# Patient Record
Sex: MALE | Race: WHITE
[De-identification: names, ages, dates, MRNs, and addresses within clinical notes are randomized per-mention and may not be internally consistent; named-entity substitution may affect disease eponyms.]

---

## 2019-02-20 ENCOUNTER — HOSPITAL ENCOUNTER (INPATIENT)
Dept: HOSPITAL 74 - YASAS | Age: 59
LOS: 4 days | Discharge: HOME | End: 2019-02-24
Attending: SURGERY | Admitting: SURGERY
Payer: COMMERCIAL

## 2019-02-20 VITALS — BODY MASS INDEX: 26.4 KG/M2

## 2019-02-20 DIAGNOSIS — R55: ICD-10-CM

## 2019-02-20 DIAGNOSIS — R63.4: ICD-10-CM

## 2019-02-20 DIAGNOSIS — E86.0: ICD-10-CM

## 2019-02-20 DIAGNOSIS — F10.230: Primary | ICD-10-CM

## 2019-02-20 DIAGNOSIS — F17.210: ICD-10-CM

## 2019-02-20 DIAGNOSIS — Z59.0: ICD-10-CM

## 2019-02-20 PROCEDURE — HZ2ZZZZ DETOXIFICATION SERVICES FOR SUBSTANCE ABUSE TREATMENT: ICD-10-PCS | Performed by: SURGERY

## 2019-02-20 RX ADMIN — Medication SCH: at 12:13

## 2019-02-20 RX ADMIN — Medication SCH MG: at 22:02

## 2019-02-20 RX ADMIN — Medication PRN MG: at 22:02

## 2019-02-20 SDOH — ECONOMIC STABILITY - HOUSING INSECURITY: HOMELESSNESS: Z59.0

## 2019-02-20 NOTE — HP
CIWA Score


Nausea/Vomitin


Muscle Tremors: 2


Anxiety: 2


Agitation: 2


Paroxysmal Sweats: 1-Minimal Palms Moist


Orientation: 0-Oriented


Tacttile Disturbances: 1-Very Mild Itch/Numbness


Auditory Disturbances: 1-Very Mild


Visual Disturbances: 0-None


Headache: 2-Mild


CIWA-Ar Total Score: 13





- Admission Criteria


[facility] Guidelines: Admission for Medically Managed Detox: 


Requires at least one of the followin. CIWA greater than 12


2. Seizures within the past 24 hours


3. Delirium tremens within the past 24 hours


4. Hallucinations within the past 24 hours


5. Acute intervention needed for co  occurring medical disorder


6. Acute intervention needed for co  occurring psychiatric disorder


7. Severe withdrawal that cannot be handled at a lower level of care (continued


    vomiting, continued diarrhea, abnormal vital signs) requiring intravenous


    medication and/or fluids


8. Pregnancy





Patient presents the following: CIWA greater than 12


Admission Criteria Met: Admission criteria met





Admission ROS [facility]





- HPI


Chief Complaint: 





i need help to stop drinking alcohol


Allergies/Adverse Reactions: 


 Allergies











Allergy/AdvReac Type Severity Reaction Status Date / Time


 


No Known Allergies Allergy   Verified 19 09:28











History of Present Illness: 





this 58 years old male with alcohol dependence,seeking detox,withdrawal symptom,

last treatment in 19  [facility],


syncope alcohol related


had admissions in detox in the past but keep relapsing


nicotine dependence 1 pack/day


weight loss


longest period of sobriety 18 months


plan for out patient program after detox








Exam Limitations: No Limitations





- Ebola screening


Have you traveled outside of the country in the last 21 days: No


Have you had contact with anyone from an Ebola affected area: No


Do you have a fever: No





- Review of Systems


Constitutional: Loss of Appetite, Malaise, Night Sweats, Changes in sleep, 

Weakness, Unintentional Wgt. Loss


EENT: reports: Nose Congestion


Respiratory: reports: No Symptoms reported


Cardiac: reports: No Symptoms Reported


GI: reports: Nausea, Poor Appetite, Abdominal cramping


: reports: No Symptoms Reported


Musculoskeletal: reports: Back Pain, Muscle Pain


Integumentary: reports: Dryness


Neuro: reports: Headache, Tremors


Endocrine: reports: No Symptoms Reported


Hematology: reports: No Symptoms Reported


Psychiatric: reports: No Sypmtoms Reported, Judgement Intact, Mood/Affect 

Appropiate, Orientated x3


Other Systems: Reviewed and Negative





Patient History





- Patient Medical History


Hx Anemia: No


Hx Asthma: No


Hx Chronic Obstructive Pulmonary Disease (COPD): No


Hx Cancer: No


Hx Cardiac Disorders: No


Hx Congestive Heart Failure: No


Hx Hypertension: No


Hx Hypercholesterolemia: No


Hx Pacemaker: No


HX Cerebrovascular Accident: No


Hx Seizures: No


Hx Dementia: No


Hx Diabetes: No


Hx Gastrointestinal Disorders: No


Hx Liver Disease: No


Hx Genitourinary Disorders: No


Hx Sexually Transmitted Disorders: No


Hx Renal Disease (ESRD): No


Hx Thyroid Disease: No


Hx Human Immunodeficiency Virus (HIV): No (last  negative)


Hx Hepatitis C: No


Hx Depression: No


Hx Suicide Attempt: No


Hx Bipolar Disorder: No


Hx Schizophrenia: No


Other Medical History: no suicidal,no homicidal





- Patient Surgical History


Past Surgical History: No





- PPD History


Previous Implant?: Yes


Documented Results: Negative w/o proof


Implanted On Prior [facility] Admission?: No


PPD to be Administered?: Yes





- Smoking Cessation


Smoking history: Current every day smoker


Have you smoked in the past 12 months: Yes


Aproximately how many cigarettes per day: 20


Cigars Per Day: 0


Hx Chewing Tobacco Use: No


Initiated information on smoking cessation: Yes


'Breaking Loose' booklet given: 19





- Substance & Tx. History


Hx Alcohol Use: Yes


Hx Substance Use: No


Substance Use Type: Alcohol


Hx Substance Use Treatment: Yes ([facility] in )





- Substances Abused


  ** Alcohol


Route: Oral


Frequency: Daily


Amount used: 2pints of vodka/10 of 16 ozs of beer


Age of first use: 9


Date of Last Use: 19





Family Disease History





- Family Disease History


Family Disease History: Other: Father (alcohol,sober)





Admission Physical Exam [facility]





- Vital Signs


Vital Signs: 





 Vital Signs











Temperature  97.1 F L  19 09:11


 


Pulse Rate  71   19 09:11


 


Respiratory Rate  20   19 09:11


 


Blood Pressure  138/79   19 09:11


 


O2 Sat by Pulse Oximetry (%)      














- Physical


General Appearance: Yes: Moderate Distress, Tremorous, Irritable, Sweating, 

Anxious


HEENTM: Yes: Normal ENT Inspection, HOLLY, Pharynx Normal


Respiratory: Yes: Lungs Clear, Normal Breath Sounds, No Respiratory Distress


Neck: Yes: Within Normal Limits, Supple, Trachea in good position


Breast: Yes: Within Normal Limits


Cardiology: Yes: Within Normal Limits, Regular Rhythm, Regular Rate, S1, S2


Abdominal: Yes: Within Normal Limits, Normal Bowel Sounds, Non Tender, Flat, 

Soft


Genitourinary: Yes: Within Normal Limits


Back: Yes: Within Normal Limits, Muscle Spasm


Musculoskeletal: Yes: Back pain, Muscle Pain


Extremities: Yes: Tremors


Neurological: Yes: CNs II-XII NML intact, Fully Oriented, Alert, Motor Strength 

5/5


Integumentary: Yes: Dry


Lymphatic: Yes: Within Normal Limits





- Diagnostic


(1) Alcohol dependence with uncomplicated withdrawal


Current Visit: Yes   Status: Acute   





(2) Syncope


Current Visit: Yes   Status: Acute   





(3) Nicotine dependence


Current Visit: Yes   Status: Acute   





(4) Dehydration


Current Visit: Yes   Status: Acute   





(5) Weight loss


Current Visit: Yes   Status: Acute   





Cleared for Admission S





- Detox or Rehab


[facility] Level of Care: Medically Managed


Detox Regimen/Protocol: Librium





Inpatient Rehab Admission





- Rehab Decision to Admit


Inpatient rehab admission?: No

## 2019-02-21 LAB
ALBUMIN SERPL-MCNC: 3.8 G/DL (ref 3.4–5)
ALP SERPL-CCNC: 72 U/L (ref 45–117)
ALT SERPL-CCNC: 32 U/L (ref 13–61)
ANION GAP SERPL CALC-SCNC: 8 MMOL/L (ref 8–16)
APPEARANCE UR: CLEAR
AST SERPL-CCNC: 22 U/L (ref 15–37)
BILIRUB SERPL-MCNC: 0.8 MG/DL (ref 0.2–1)
BILIRUB UR STRIP.AUTO-MCNC: NEGATIVE MG/DL
BUN SERPL-MCNC: 12 MG/DL (ref 7–18)
CALCIUM SERPL-MCNC: 9.3 MG/DL (ref 8.5–10.1)
CHLORIDE SERPL-SCNC: 104 MMOL/L (ref 98–107)
CO2 SERPL-SCNC: 24 MMOL/L (ref 21–32)
COLOR UR: (no result)
CREAT SERPL-MCNC: 0.7 MG/DL (ref 0.55–1.3)
DEPRECATED RDW RBC AUTO: 13.6 % (ref 11.9–15.9)
GLUCOSE SERPL-MCNC: 107 MG/DL (ref 74–106)
HCT VFR BLD CALC: 46.5 % (ref 35.4–49)
HGB BLD-MCNC: 15.7 GM/DL (ref 11.7–16.9)
KETONES UR QL STRIP: NEGATIVE
LEUKOCYTE ESTERASE UR QL STRIP.AUTO: NEGATIVE
MCH RBC QN AUTO: 31.8 PG (ref 25.7–33.7)
MCHC RBC AUTO-ENTMCNC: 33.7 G/DL (ref 32–35.9)
MCV RBC: 94.5 FL (ref 80–96)
NITRITE UR QL STRIP: NEGATIVE
PH UR: 6 [PH] (ref 5–8)
PLATELET # BLD AUTO: 198 K/MM3 (ref 134–434)
PMV BLD: 8.5 FL (ref 7.5–11.1)
POTASSIUM SERPLBLD-SCNC: 3.9 MMOL/L (ref 3.5–5.1)
PROT SERPL-MCNC: 7.6 G/DL (ref 6.4–8.2)
PROT UR QL STRIP: NEGATIVE
PROT UR QL STRIP: NEGATIVE
RBC # BLD AUTO: 4.93 M/MM3 (ref 4–5.6)
SODIUM SERPL-SCNC: 137 MMOL/L (ref 136–145)
SP GR UR: 1.01 (ref 1.01–1.03)
UROBILINOGEN UR STRIP-MCNC: NEGATIVE MG/DL (ref 0.2–1)
WBC # BLD AUTO: 5.1 K/MM3 (ref 4–10)

## 2019-02-21 RX ADMIN — Medication PRN MG: at 22:24

## 2019-02-21 RX ADMIN — Medication SCH TAB: at 10:18

## 2019-02-21 RX ADMIN — NICOTINE SCH: 21 PATCH TRANSDERMAL at 12:30

## 2019-02-21 RX ADMIN — Medication SCH: at 23:19

## 2019-02-21 NOTE — EKG
Test Reason : 

Blood Pressure : ***/*** mmHG

Vent. Rate : 075 BPM     Atrial Rate : 075 BPM

   P-R Int : 164 ms          QRS Dur : 094 ms

    QT Int : 396 ms       P-R-T Axes : 063 -19 -23 degrees

   QTc Int : 442 ms

 

NORMAL SINUS RHYTHM

SEPTAL INFARCT , AGE UNDETERMINED

ABNORMAL ECG

NO PREVIOUS ECGS AVAILABLE

Confirmed by SAE BALDWIN, MARLEN ([id number]) on 2/21/2019 2:33:41 AM

 

Referred By:  XIMENA GASTON           Confirmed By:MARLEN QUEVEDO MD

## 2019-02-21 NOTE — PN
S CIWA





- CIWA Score


Nausea/Vomitin-No Nausea/No Vomiting


Muscle Tremors: 4-Moderate,w/Arms Extend


Anxiety: 3


Agitation: 4-Moderately Restless


Paroxysmal Sweats: 3


Orientation: 0-Oriented


Tacttile Disturbances: 0-None


Auditory Disturbances: 0-None


Visual Disturbances: 0-None


Headache: 1-Very Mild


CIWA-Ar Total Score: 15





[facility] Progress Note (SOAP)


Subjective: 





sweats


shakes


headache


interrupted sleep


body aches


Objective: 





19 10:43


 Vital Signs











Temperature  98.2 F   19 09:28


 


Pulse Rate  81   19 09:28


 


Respiratory Rate  18   19 09:28


 


Blood Pressure  116/82   19 09:28


 


O2 Sat by Pulse Oximetry (%)      











rest of labs pending


aaox3


ambulating


no acute distress


Assessment: 





19 10:44


withdrawal sx


Plan: 





continue detox


increase fluids


labs pending

## 2019-02-22 RX ADMIN — Medication SCH TAB: at 10:27

## 2019-02-22 RX ADMIN — NICOTINE SCH MG: 21 PATCH TRANSDERMAL at 10:27

## 2019-02-22 RX ADMIN — Medication SCH MG: at 22:46

## 2019-02-22 RX ADMIN — Medication PRN MG: at 22:49

## 2019-02-22 NOTE — PN
[facility] CIWA





- CIWA Score


Nausea/Vomitin-No Nausea/No Vomiting


Muscle Tremors: 3


Anxiety: 3


Agitation: 3


Paroxysmal Sweats: 3


Orientation: 0-Oriented


Tacttile Disturbances: 0-None


Auditory Disturbances: 0-None


Visual Disturbances: 0-None


Headache: 0-None Present


CIWA-Ar Total Score: 12





[facility] Progress Note (SOAP)


Subjective: 





sweats


shakes


interrupted sleep


tired


Objective: 





19 10:36


 Vital Signs











Temperature  98.0 F   19 08:58


 


Pulse Rate  89   19 08:58


 


Respiratory Rate  18   19 08:58


 


Blood Pressure  118/82   19 08:58


 


O2 Sat by Pulse Oximetry (%)      








 Laboratory Tests











  19





  06:00 08:38 08:38


 


WBC  5.1  


 


RBC  4.93  


 


Hgb  15.7  


 


Hct  46.5  


 


MCV  94.5  


 


MCH  31.8  


 


MCHC  33.7  


 


RDW  13.6  


 


Plt Count  198  


 


MPV  8.5  


 


Sodium   137 


 


Potassium   3.9 


 


Chloride   104 


 


Carbon Dioxide   24 


 


Anion Gap   8 


 


BUN   12 


 


Creatinine   0.7 


 


Creat Clearance w eGFR   > 60 


 


Random Glucose   107 H 


 


Calcium   9.3 


 


Total Bilirubin   0.8 


 


AST   22 


 


ALT   32 


 


Alkaline Phosphatase   72 


 


Total Protein   7.6 


 


Albumin   3.8 


 


Urine Color   


 


Urine Appearance   


 


Urine pH   


 


Ur Specific Gravity   


 


Urine Protein   


 


Urine Glucose (UA)   


 


Urine Ketones   


 


Urine Blood   


 


Urine Nitrite   


 


Urine Bilirubin   


 


Urine Urobilinogen   


 


Ur Leukocyte Esterase   


 


RPR Titer    Nonreactive














  19





  11:15


 


WBC 


 


RBC 


 


Hgb 


 


Hct 


 


MCV 


 


MCH 


 


MCHC 


 


RDW 


 


Plt Count 


 


MPV 


 


Sodium 


 


Potassium 


 


Chloride 


 


Carbon Dioxide 


 


Anion Gap 


 


BUN 


 


Creatinine 


 


Creat Clearance w eGFR 


 


Random Glucose 


 


Calcium 


 


Total Bilirubin 


 


AST 


 


ALT 


 


Alkaline Phosphatase 


 


Total Protein 


 


Albumin 


 


Urine Color  Ltyellow


 


Urine Appearance  Clear


 


Urine pH  6.0


 


Ur Specific Gravity  1.012


 


Urine Protein  Negative


 


Urine Glucose (UA)  Negative


 


Urine Ketones  Negative


 


Urine Blood  Negative


 


Urine Nitrite  Negative


 


Urine Bilirubin  Negative


 


Urine Urobilinogen  Negative


 


Ur Leukocyte Esterase  Negative


 


RPR Titer 








aaox3


ambulating


no acute distress


Assessment: 





19 10:38


withdrawal sx


Plan: 





continue detox


increase fluids

## 2019-02-23 RX ADMIN — Medication PRN MG: at 22:07

## 2019-02-23 RX ADMIN — Medication SCH TAB: at 10:54

## 2019-02-23 RX ADMIN — Medication SCH MG: at 22:07

## 2019-02-23 RX ADMIN — NICOTINE SCH MG: 21 PATCH TRANSDERMAL at 10:54

## 2019-02-23 NOTE — PN
[facility] Progress Note (SOAP)


Subjective: 





REPORTS SLIGHT ANXIETY, INTERMITTENT SLEEP AND FATIGUE.


Objective: 





02/23/19 14:31


 Vital Signs











  02/23/19





  10:12


 


Temperature 98.1 F


 


Pulse Rate 83


 


Respiratory 18





Rate 


 


Blood Pressure 103/73








 Laboratory Tests











  02/21/19 02/21/19 02/21/19





  06:00 08:38 08:38


 


WBC  5.1  


 


RBC  4.93  


 


Hgb  15.7  


 


Hct  46.5  


 


MCV  94.5  


 


MCH  31.8  


 


MCHC  33.7  


 


RDW  13.6  


 


Plt Count  198  


 


MPV  8.5  


 


Sodium   137 


 


Potassium   3.9 


 


Chloride   104 


 


Carbon Dioxide   24 


 


Anion Gap   8 


 


BUN   12 


 


Creatinine   0.7 


 


Creat Clearance w eGFR   > 60 


 


Random Glucose   107 H 


 


Calcium   9.3 


 


Total Bilirubin   0.8 


 


AST   22 


 


ALT   32 


 


Alkaline Phosphatase   72 


 


Total Protein   7.6 


 


Albumin   3.8 


 


Urine Color   


 


Urine Appearance   


 


Urine pH   


 


Ur Specific Gravity   


 


Urine Protein   


 


Urine Glucose (UA)   


 


Urine Ketones   


 


Urine Blood   


 


Urine Nitrite   


 


Urine Bilirubin   


 


Urine Urobilinogen   


 


Ur Leukocyte Esterase   


 


RPR Titer    Nonreactive














  02/21/19





  11:15


 


WBC 


 


RBC 


 


Hgb 


 


Hct 


 


MCV 


 


MCH 


 


MCHC 


 


RDW 


 


Plt Count 


 


MPV 


 


Sodium 


 


Potassium 


 


Chloride 


 


Carbon Dioxide 


 


Anion Gap 


 


BUN 


 


Creatinine 


 


Creat Clearance w eGFR 


 


Random Glucose 


 


Calcium 


 


Total Bilirubin 


 


AST 


 


ALT 


 


Alkaline Phosphatase 


 


Total Protein 


 


Albumin 


 


Urine Color  Ltyellow


 


Urine Appearance  Clear


 


Urine pH  6.0


 


Ur Specific Gravity  1.012


 


Urine Protein  Negative


 


Urine Glucose (UA)  Negative


 


Urine Ketones  Negative


 


Urine Blood  Negative


 


Urine Nitrite  Negative


 


Urine Bilirubin  Negative


 


Urine Urobilinogen  Negative


 


Ur Leukocyte Esterase  Negative


 


RPR Titer 











Assessment: 





02/23/19 14:31


WITHDRAWAL SX


Plan: 





CONTINUE DETOX

## 2019-02-24 VITALS — DIASTOLIC BLOOD PRESSURE: 65 MMHG | TEMPERATURE: 97.7 F | SYSTOLIC BLOOD PRESSURE: 118 MMHG | HEART RATE: 67 BPM

## 2019-02-24 NOTE — DS
[facility] Detox Discharge Summary


Admission Date: 


02/20/19





Discharge Date: 02/24/19





- History


Present History: Alcohol Dependence


Additional Comments: 





Patient completed detox successfully and is scheduled for discharge today. 

Patient is A/A/Ox3, in nad, vss, ambulatory. Patient instructed to follow up 

with his PCP within 1-2 weeks.


Pertinent Past History: 





Nicotine dependence





Alcohol dependence





- Physical Exam Results


Vital Signs: 


 Vital Signs











Temperature  97.7 F   02/24/19 06:00


 


Pulse Rate  67   02/24/19 06:00


 


Respiratory Rate  18   02/24/19 06:00


 


Blood Pressure  118/65   02/24/19 06:00


 


O2 Sat by Pulse Oximetry (%)      











Pertinent Admission Physical Exam Findings: 





Withdrawal symptoms





 Laboratory Tests











  02/21/19 02/21/19 02/21/19





  06:00 08:38 08:38


 


WBC  5.1  


 


RBC  4.93  


 


Hgb  15.7  


 


Hct  46.5  


 


MCV  94.5  


 


MCH  31.8  


 


MCHC  33.7  


 


RDW  13.6  


 


Plt Count  198  


 


MPV  8.5  


 


Sodium   137 


 


Potassium   3.9 


 


Chloride   104 


 


Carbon Dioxide   24 


 


Anion Gap   8 


 


BUN   12 


 


Creatinine   0.7 


 


Creat Clearance w eGFR   > 60 


 


Random Glucose   107 H 


 


Calcium   9.3 


 


Total Bilirubin   0.8 


 


AST   22 


 


ALT   32 


 


Alkaline Phosphatase   72 


 


Total Protein   7.6 


 


Albumin   3.8 


 


Urine Color   


 


Urine Appearance   


 


Urine pH   


 


Ur Specific Gravity   


 


Urine Protein   


 


Urine Glucose (UA)   


 


Urine Ketones   


 


Urine Blood   


 


Urine Nitrite   


 


Urine Bilirubin   


 


Urine Urobilinogen   


 


Ur Leukocyte Esterase   


 


RPR Titer    Nonreactive














  02/21/19





  11:15


 


WBC 


 


RBC 


 


Hgb 


 


Hct 


 


MCV 


 


MCH 


 


MCHC 


 


RDW 


 


Plt Count 


 


MPV 


 


Sodium 


 


Potassium 


 


Chloride 


 


Carbon Dioxide 


 


Anion Gap 


 


BUN 


 


Creatinine 


 


Creat Clearance w eGFR 


 


Random Glucose 


 


Calcium 


 


Total Bilirubin 


 


AST 


 


ALT 


 


Alkaline Phosphatase 


 


Total Protein 


 


Albumin 


 


Urine Color  Ltyellow


 


Urine Appearance  Clear


 


Urine pH  6.0


 


Ur Specific Gravity  1.012


 


Urine Protein  Negative


 


Urine Glucose (UA)  Negative


 


Urine Ketones  Negative


 


Urine Blood  Negative


 


Urine Nitrite  Negative


 


Urine Bilirubin  Negative


 


Urine Urobilinogen  Negative


 


Ur Leukocyte Esterase  Negative


 


RPR Titer 








Labs reviewed





- Treatment


Hospital Course: Detox Protocol Followed, Detoxed Safely, Responded well, 

Discharged Condition Good





- Medication


Discharge Medications: 


Ambulatory Orders





NK [No Known Home Medication]  02/20/19 











- Diagnosis


(1) Alcohol dependence with uncomplicated withdrawal


Current Visit: Yes   Status: Acute   





(2) Nicotine dependence


Current Visit: Yes   Status: Chronic   





- AMA


Did Patient Leave Against Medical Advice: No (Follow up with PCP within 1-2 

weeks)